# Patient Record
Sex: MALE | URBAN - METROPOLITAN AREA
[De-identification: names, ages, dates, MRNs, and addresses within clinical notes are randomized per-mention and may not be internally consistent; named-entity substitution may affect disease eponyms.]

---

## 2023-03-08 ENCOUNTER — APPOINTMENT (RX ONLY)
Dept: URBAN - METROPOLITAN AREA CLINIC 121 | Facility: CLINIC | Age: 43
Setting detail: DERMATOLOGY
End: 2023-03-08

## 2023-03-08 DIAGNOSIS — L21.8 OTHER SEBORRHEIC DERMATITIS: ICD-10-CM

## 2023-03-08 DIAGNOSIS — D22 MELANOCYTIC NEVI: ICD-10-CM

## 2023-03-08 PROBLEM — D22.62 MELANOCYTIC NEVI OF LEFT UPPER LIMB, INCLUDING SHOULDER: Status: ACTIVE | Noted: 2023-03-08

## 2023-03-08 PROCEDURE — ? PRESCRIPTION MEDICATION MANAGEMENT

## 2023-03-08 PROCEDURE — ? PRESCRIPTION

## 2023-03-08 PROCEDURE — 99204 OFFICE O/P NEW MOD 45 MIN: CPT

## 2023-03-08 PROCEDURE — ? COUNSELING

## 2023-03-08 RX ORDER — KETOCONAZOLE 20 MG/ML
SHAMPOO, SUSPENSION TOPICAL TIW
Qty: 120 | Refills: 3 | Status: ERX | COMMUNITY
Start: 2023-03-08

## 2023-03-08 RX ORDER — CLOTRIMAZOLE 1 G/100G
CREAM TOPICAL QD
Qty: 45 | Refills: 1 | Status: ERX | COMMUNITY
Start: 2023-03-08

## 2023-03-08 RX ADMIN — CLOTRIMAZOLE: 1 CREAM TOPICAL at 00:00

## 2023-03-08 RX ADMIN — KETOCONAZOLE: 20 SHAMPOO, SUSPENSION TOPICAL at 00:00

## 2023-03-08 ASSESSMENT — LOCATION DETAILED DESCRIPTION DERM
LOCATION DETAILED: RIGHT INFERIOR MEDIAL MALAR CHEEK
LOCATION DETAILED: LEFT PROXIMAL PALMAR SMALL FINGER
LOCATION DETAILED: LEFT INFERIOR CENTRAL MALAR CHEEK

## 2023-03-08 ASSESSMENT — LOCATION ZONE DERM
LOCATION ZONE: FINGER
LOCATION ZONE: FACE

## 2023-03-08 ASSESSMENT — LOCATION SIMPLE DESCRIPTION DERM
LOCATION SIMPLE: LEFT CHEEK
LOCATION SIMPLE: RIGHT CHEEK
LOCATION SIMPLE: LEFT SMALL FINGER

## 2023-03-08 NOTE — PROCEDURE: PRESCRIPTION MEDICATION MANAGEMENT
Detail Level: Zone
Render In Strict Bullet Format?: No
Initiate Treatment: Wash face daily with Ketoconazole shampoo\\nApply Clotrimazole cream daily

## 2025-08-19 ENCOUNTER — APPOINTMENT (OUTPATIENT)
Dept: URBAN - METROPOLITAN AREA CLINIC 121 | Facility: CLINIC | Age: 45
Setting detail: DERMATOLOGY
End: 2025-08-19

## 2025-08-19 DIAGNOSIS — L21.8 OTHER SEBORRHEIC DERMATITIS: ICD-10-CM

## 2025-08-19 PROCEDURE — ? PRESCRIPTION

## 2025-08-19 PROCEDURE — ?

## 2025-08-19 PROCEDURE — ? PRESCRIPTION MEDICATION MANAGEMENT

## 2025-08-19 PROCEDURE — ? COUNSELING

## 2025-08-19 RX ORDER — KETOCONAZOLE 20 MG/G
CREAM TOPICAL ONCE A DAY
Qty: 30 | Refills: 6 | Status: ERX | COMMUNITY
Start: 2025-08-19

## 2025-08-19 RX ADMIN — KETOCONAZOLE: 20 CREAM TOPICAL at 00:00
